# Patient Record
Sex: MALE | Race: BLACK OR AFRICAN AMERICAN | NOT HISPANIC OR LATINO | Employment: OTHER | URBAN - METROPOLITAN AREA
[De-identification: names, ages, dates, MRNs, and addresses within clinical notes are randomized per-mention and may not be internally consistent; named-entity substitution may affect disease eponyms.]

---

## 2024-04-28 ENCOUNTER — HOSPITAL ENCOUNTER (EMERGENCY)
Facility: HOSPITAL | Age: 54
Discharge: HOME | End: 2024-04-28
Attending: EMERGENCY MEDICINE

## 2024-04-28 ENCOUNTER — APPOINTMENT (OUTPATIENT)
Dept: RADIOLOGY | Facility: HOSPITAL | Age: 54
End: 2024-04-28

## 2024-04-28 VITALS
WEIGHT: 245 LBS | TEMPERATURE: 97 F | RESPIRATION RATE: 18 BRPM | BODY MASS INDEX: 37.25 KG/M2 | HEART RATE: 70 BPM | SYSTOLIC BLOOD PRESSURE: 188 MMHG | DIASTOLIC BLOOD PRESSURE: 92 MMHG | OXYGEN SATURATION: 97 %

## 2024-04-28 DIAGNOSIS — R07.81 RIB PAIN ON LEFT SIDE: Primary | ICD-10-CM

## 2024-04-28 PROCEDURE — 99283 EMERGENCY DEPT VISIT LOW MDM: CPT

## 2024-04-28 PROCEDURE — 96372 THER/PROPH/DIAG INJ SC/IM: CPT | Performed by: EMERGENCY MEDICINE

## 2024-04-28 PROCEDURE — 71101 X-RAY EXAM UNILAT RIBS/CHEST: CPT | Mod: LEFT SIDE | Performed by: RADIOLOGY

## 2024-04-28 PROCEDURE — 71101 X-RAY EXAM UNILAT RIBS/CHEST: CPT | Mod: LT

## 2024-04-28 PROCEDURE — 2500000004 HC RX 250 GENERAL PHARMACY W/ HCPCS (ALT 636 FOR OP/ED): Performed by: EMERGENCY MEDICINE

## 2024-04-28 RX ORDER — KETOROLAC TROMETHAMINE 30 MG/ML
30 INJECTION, SOLUTION INTRAMUSCULAR; INTRAVENOUS ONCE
Status: COMPLETED | OUTPATIENT
Start: 2024-04-28 | End: 2024-04-28

## 2024-04-28 RX ADMIN — KETOROLAC TROMETHAMINE 30 MG: 30 INJECTION, SOLUTION INTRAMUSCULAR at 10:47

## 2024-04-28 ASSESSMENT — COLUMBIA-SUICIDE SEVERITY RATING SCALE - C-SSRS
6. HAVE YOU EVER DONE ANYTHING, STARTED TO DO ANYTHING, OR PREPARED TO DO ANYTHING TO END YOUR LIFE?: NO
2. HAVE YOU ACTUALLY HAD ANY THOUGHTS OF KILLING YOURSELF?: NO
1. IN THE PAST MONTH, HAVE YOU WISHED YOU WERE DEAD OR WISHED YOU COULD GO TO SLEEP AND NOT WAKE UP?: NO

## 2024-04-28 NOTE — ED TRIAGE NOTES
Patient states he slipped on rug in bathroom 6 days ago, caught himself on bathtub. C/o left rib pain states it got worse yesterday when he bent over. No short of breath. No other complaints offered.

## 2024-04-28 NOTE — ED PROVIDER NOTES
HPI   Chief Complaint   Patient presents with    Rib Injury     Patient states he slipped on rug in bathroom 6 days ago, caught himself on bathtub. C/o left rib pain states it got worse yesterday when he bent over. No short of breath. No other complaints offered.       53-year-old male who presents with left rib pain.  Patient states on Monday which is 5 days ago he fell in the bathtub hitting his left side of his rib when he fell.  He states the other day he was bending over and felt a pop on the same side.  He denies any shortness of breath but states she has had increased pain.  He has been taking over-the-counter Motrin and Aleve.  Patient denies hitting his head or any LOC.  Denies any neck or back pain.                          No data recorded                   Patient History   No past medical history on file.  Past Surgical History:   Procedure Laterality Date    HAND SURGERY  04/18/2017    Hand Surgery                                                                                                                                                          OTHER SURGICAL HISTORY  05/09/2017    Ventral Hernia Repair - Incarcerated     No family history on file.  Social History     Tobacco Use    Smoking status: Not on file    Smokeless tobacco: Not on file   Substance Use Topics    Alcohol use: Not on file    Drug use: Not on file       Physical Exam   ED Triage Vitals [04/28/24 0946]   Temperature Heart Rate Respirations BP   36.1 °C (97 °F) 70 18 (!) 188/92      Pulse Ox Temp Source Heart Rate Source Patient Position   97 % Temporal Monitor Sitting      BP Location FiO2 (%)     Right arm --       Physical Exam  Constitutional:       Appearance: Normal appearance. He is normal weight.   HENT:      Head: Normocephalic and atraumatic.      Nose: Nose normal.      Mouth/Throat:      Mouth: Mucous membranes are moist.      Pharynx: Oropharynx is clear.   Eyes:      Extraocular Movements: Extraocular movements  intact.      Conjunctiva/sclera: Conjunctivae normal.      Pupils: Pupils are equal, round, and reactive to light.   Cardiovascular:      Rate and Rhythm: Normal rate and regular rhythm.   Pulmonary:      Effort: Pulmonary effort is normal.      Breath sounds: Normal breath sounds.      Comments: Mild tenderness palpation over the left side of the rib cage.  No ecchymosis.  No subcu air crepitus felt.  Abdominal:      General: Abdomen is flat. Bowel sounds are normal.      Palpations: Abdomen is soft.   Musculoskeletal:         General: Normal range of motion.      Cervical back: Normal range of motion and neck supple.   Skin:     General: Skin is warm and dry.      Capillary Refill: Capillary refill takes less than 2 seconds.   Neurological:      General: No focal deficit present.      Mental Status: He is alert.   Psychiatric:         Mood and Affect: Mood normal.         Behavior: Behavior normal.         Thought Content: Thought content normal.         Judgment: Judgment normal.       XR ribs 2 views left w chest pa or ap   Final Result   NO ACUTE CARDIOPULMONARY PROCESS        RIBS AND ALL OTHER INCLUDED OSSEOUS STRUCTURES INTACT AND IN GOOD   ALIGNMENT        MACRO:   None        Signed by: Lawrence Davis 4/28/2024 10:36 AM   Dictation workstation:   [id number]            ED Course & MDM   Diagnoses as of 04/28/24 1043   Rib pain on left side       Medical Decision Making  Emergency department course, left rib x-rays with a chest x-ray was obtained which shows no acute injuries.  Patient was given Toradol IM.  I feel comfortable discharging the patient home.  He is to follow-up with his primary care physician.        Procedure  Procedures     Kimberley Cisneros,   04/28/24 1043